# Patient Record
Sex: MALE | Race: WHITE | Employment: FULL TIME | URBAN - METROPOLITAN AREA
[De-identification: names, ages, dates, MRNs, and addresses within clinical notes are randomized per-mention and may not be internally consistent; named-entity substitution may affect disease eponyms.]

---

## 2019-04-12 ENCOUNTER — ANCILLARY PROCEDURE (OUTPATIENT)
Dept: GENERAL RADIOLOGY | Facility: CLINIC | Age: 34
End: 2019-04-12
Attending: PHYSICIAN ASSISTANT
Payer: COMMERCIAL

## 2019-04-12 ENCOUNTER — OFFICE VISIT (OUTPATIENT)
Dept: FAMILY MEDICINE | Facility: CLINIC | Age: 34
End: 2019-04-12
Payer: COMMERCIAL

## 2019-04-12 VITALS
DIASTOLIC BLOOD PRESSURE: 76 MMHG | BODY MASS INDEX: 36.96 KG/M2 | HEIGHT: 71 IN | HEART RATE: 76 BPM | RESPIRATION RATE: 16 BRPM | WEIGHT: 264 LBS | SYSTOLIC BLOOD PRESSURE: 134 MMHG | TEMPERATURE: 98.2 F

## 2019-04-12 DIAGNOSIS — M75.42 IMPINGEMENT SYNDROME, SHOULDER, LEFT: Primary | ICD-10-CM

## 2019-04-12 PROCEDURE — 99214 OFFICE O/P EST MOD 30 MIN: CPT | Performed by: PHYSICIAN ASSISTANT

## 2019-04-12 PROCEDURE — 73030 X-RAY EXAM OF SHOULDER: CPT | Mod: LT

## 2019-04-12 RX ORDER — DICLOFENAC SODIUM 75 MG/1
75 TABLET, DELAYED RELEASE ORAL 2 TIMES DAILY
Qty: 60 TABLET | Refills: 0 | Status: SHIPPED | OUTPATIENT
Start: 2019-04-12 | End: 2022-05-23

## 2019-04-12 ASSESSMENT — PAIN SCALES - GENERAL: PAINLEVEL: NO PAIN (0)

## 2019-04-12 ASSESSMENT — MIFFLIN-ST. JEOR: SCORE: 2163.59
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
## 2022-05-23 ENCOUNTER — OFFICE VISIT (OUTPATIENT)
Dept: FAMILY MEDICINE | Facility: CLINIC | Age: 37
End: 2022-05-23
Payer: COMMERCIAL

## 2022-05-23 VITALS
OXYGEN SATURATION: 100 % | WEIGHT: 264 LBS | SYSTOLIC BLOOD PRESSURE: 134 MMHG | TEMPERATURE: 97.8 F | BODY MASS INDEX: 35.76 KG/M2 | HEART RATE: 80 BPM | DIASTOLIC BLOOD PRESSURE: 85 MMHG | HEIGHT: 72 IN

## 2022-05-23 DIAGNOSIS — M25.511 CHRONIC RIGHT SHOULDER PAIN: ICD-10-CM

## 2022-05-23 DIAGNOSIS — R73.03 PREDIABETES: ICD-10-CM

## 2022-05-23 DIAGNOSIS — Z11.59 NEED FOR HEPATITIS C SCREENING TEST: ICD-10-CM

## 2022-05-23 DIAGNOSIS — Z13.220 SCREENING FOR HYPERLIPIDEMIA: ICD-10-CM

## 2022-05-23 DIAGNOSIS — Z11.4 SCREENING FOR HIV (HUMAN IMMUNODEFICIENCY VIRUS): ICD-10-CM

## 2022-05-23 DIAGNOSIS — Z00.00 ROUTINE GENERAL MEDICAL EXAMINATION AT A HEALTH CARE FACILITY: Primary | ICD-10-CM

## 2022-05-23 DIAGNOSIS — E78.5 HYPERLIPIDEMIA LDL GOAL <160: ICD-10-CM

## 2022-05-23 DIAGNOSIS — G89.29 CHRONIC RIGHT SHOULDER PAIN: ICD-10-CM

## 2022-05-23 PROCEDURE — 90471 IMMUNIZATION ADMIN: CPT | Performed by: PHYSICIAN ASSISTANT

## 2022-05-23 PROCEDURE — 90715 TDAP VACCINE 7 YRS/> IM: CPT | Performed by: PHYSICIAN ASSISTANT

## 2022-05-23 PROCEDURE — 99213 OFFICE O/P EST LOW 20 MIN: CPT | Mod: 25 | Performed by: PHYSICIAN ASSISTANT

## 2022-05-23 PROCEDURE — 99385 PREV VISIT NEW AGE 18-39: CPT | Mod: 25 | Performed by: PHYSICIAN ASSISTANT

## 2022-05-23 ASSESSMENT — ENCOUNTER SYMPTOMS
PARESTHESIAS: 0
SORE THROAT: 0
NAUSEA: 0
ARTHRALGIAS: 1
SHORTNESS OF BREATH: 0
PALPITATIONS: 0
WEAKNESS: 0
MYALGIAS: 0
ABDOMINAL PAIN: 0
FREQUENCY: 0
HEMATURIA: 0
FEVER: 0
EYE PAIN: 0
JOINT SWELLING: 0
CHILLS: 0
COUGH: 0
DIARRHEA: 0
HEMATOCHEZIA: 0
HEARTBURN: 0
DIZZINESS: 0
HEADACHES: 1
NERVOUS/ANXIOUS: 0
DYSURIA: 0
CONSTIPATION: 0
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
## 2022-05-23 NOTE — NURSING NOTE
Prior to immunization administration, verified patients identity using patient s name and date of birth. Please see Immunization Activity for additional information.     Screening Questionnaire for Adult Immunization    Are you sick today?   No   Do you have allergies to medications, food, a vaccine component or latex?   No   Have you ever had a serious reaction after receiving a vaccination?   No   Do you have a long-term health problem with heart, lung, kidney, or metabolic disease (e.g., diabetes), asthma, a blood disorder, no spleen, complement component deficiency, a cochlear implant, or a spinal fluid leak?  Are you on long-term aspirin therapy?   No   Do you have cancer, leukemia, HIV/AIDS, or any other immune system problem?   No   Do you have a parent, brother, or sister with an immune system problem?   No   In the past 3 months, have you taken medications that affect  your immune system, such as prednisone, other steroids, or anticancer drugs; drugs for the treatment of rheumatoid arthritis, Crohn s disease, or psoriasis; or have you had radiation treatments?   No   Have you had a seizure, or a brain or other nervous system problem?   No   During the past year, have you received a transfusion of blood or blood    products, or been given immune (gamma) globulin or antiviral drug?   No   For women: Are you pregnant or is there a chance you could become       pregnant during the next month?   No   Have you received any vaccinations in the past 4 weeks?   No     Immunization questionnaire answers were all negative.        Per orders of Dr. Mendez, injection of TDAP given by Shawna Jo MA. Patient instructed to remain in clinic for 15 minutes afterwards, and to report any adverse reaction to me immediately.       Screening performed by Shawna Jo MA on 5/23/2022 at 11:15 AM.

## 2022-06-02 ENCOUNTER — LAB (OUTPATIENT)
Dept: LAB | Facility: CLINIC | Age: 37
End: 2022-06-02
Payer: COMMERCIAL

## 2022-06-02 DIAGNOSIS — Z13.220 SCREENING FOR HYPERLIPIDEMIA: ICD-10-CM

## 2022-06-02 DIAGNOSIS — Z11.4 SCREENING FOR HIV (HUMAN IMMUNODEFICIENCY VIRUS): ICD-10-CM

## 2022-06-02 DIAGNOSIS — Z00.00 ROUTINE GENERAL MEDICAL EXAMINATION AT A HEALTH CARE FACILITY: ICD-10-CM

## 2022-06-02 DIAGNOSIS — Z11.59 NEED FOR HEPATITIS C SCREENING TEST: ICD-10-CM

## 2022-06-02 LAB
ALBUMIN SERPL-MCNC: 3.7 G/DL (ref 3.4–5)
ALP SERPL-CCNC: 89 U/L (ref 40–150)
ALT SERPL W P-5'-P-CCNC: 35 U/L (ref 0–70)
ANION GAP SERPL CALCULATED.3IONS-SCNC: 3 MMOL/L (ref 3–14)
AST SERPL W P-5'-P-CCNC: 20 U/L (ref 0–45)
BILIRUB SERPL-MCNC: 0.3 MG/DL (ref 0.2–1.3)
BUN SERPL-MCNC: 14 MG/DL (ref 7–30)
CALCIUM SERPL-MCNC: 9.2 MG/DL (ref 8.5–10.1)
CHLORIDE BLD-SCNC: 108 MMOL/L (ref 94–109)
CHOLEST SERPL-MCNC: 183 MG/DL
CO2 SERPL-SCNC: 28 MMOL/L (ref 20–32)
CREAT SERPL-MCNC: 0.96 MG/DL (ref 0.66–1.25)
FASTING STATUS PATIENT QL REPORTED: YES
GFR SERPL CREATININE-BSD FRML MDRD: >90 ML/MIN/1.73M2
GLUCOSE BLD-MCNC: 116 MG/DL (ref 70–99)
HCV AB SERPL QL IA: NONREACTIVE
HDLC SERPL-MCNC: 38 MG/DL
HIV 1+2 AB+HIV1 P24 AG SERPL QL IA: NONREACTIVE
LDLC SERPL CALC-MCNC: 103 MG/DL
NONHDLC SERPL-MCNC: 145 MG/DL
POTASSIUM BLD-SCNC: 4.7 MMOL/L (ref 3.4–5.3)
PROT SERPL-MCNC: 7.6 G/DL (ref 6.8–8.8)
SODIUM SERPL-SCNC: 139 MMOL/L (ref 133–144)
TRIGL SERPL-MCNC: 210 MG/DL

## 2022-06-02 PROCEDURE — 36415 COLL VENOUS BLD VENIPUNCTURE: CPT

## 2022-06-02 PROCEDURE — 80053 COMPREHEN METABOLIC PANEL: CPT

## 2022-06-02 PROCEDURE — 86803 HEPATITIS C AB TEST: CPT

## 2022-06-02 PROCEDURE — 87389 HIV-1 AG W/HIV-1&-2 AB AG IA: CPT

## 2022-06-02 PROCEDURE — 80061 LIPID PANEL: CPT

## 2022-06-03 PROBLEM — R73.03 PREDIABETES: Status: ACTIVE | Noted: 2022-06-03

## 2022-06-03 PROBLEM — E78.5 HYPERLIPIDEMIA LDL GOAL <160: Status: ACTIVE | Noted: 2022-06-03

## 2022-06-07 ENCOUNTER — OFFICE VISIT (OUTPATIENT)
Dept: ORTHOPEDICS | Facility: CLINIC | Age: 37
End: 2022-06-07
Attending: PHYSICIAN ASSISTANT
Payer: COMMERCIAL

## 2022-06-07 VITALS
DIASTOLIC BLOOD PRESSURE: 80 MMHG | SYSTOLIC BLOOD PRESSURE: 134 MMHG | HEIGHT: 72 IN | WEIGHT: 264 LBS | BODY MASS INDEX: 35.76 KG/M2

## 2022-06-07 DIAGNOSIS — S49.91XD INJURY OF RIGHT SHOULDER, SUBSEQUENT ENCOUNTER: ICD-10-CM

## 2022-06-07 PROCEDURE — 99204 OFFICE O/P NEW MOD 45 MIN: CPT | Performed by: PEDIATRICS

## 2022-06-07 NOTE — PATIENT INSTRUCTIONS
Primary considerations at this point include rotator cuff injury, possible development of frozen shoulder syndrome.  Given ongoing symptoms despite physical therapy, plan MRI of the right shoulder next.  Order has been placed.  Plan to contact you with MRI results.  In the meantime, keep up with home exercises from physical therapy.    Advanced imaging is done by appointment. Please call [place] [place] [facility] [place]: [phone] to schedule your MRI.     Depending on your availability you can usually schedule within the next 1-2 days.    Some insurance companies may require a prior authorization to be completed which can delay the time until you are able to schedule your appointment.       If you are active on [employer], you may have access to your test results before your provider is able to review the study and advise on next steps.      The clinic will call you with results. If you have not heard from the clinic within 2-3 days following your MRI, please contact us at the number listed below.    If you have any further questions for your physician or physician s care team you can call [phone] and use option 3 to leave a voice message. Calls received during business hours will be returned same day.
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
## 2022-06-14 ENCOUNTER — ANCILLARY PROCEDURE (OUTPATIENT)
Dept: MRI IMAGING | Facility: CLINIC | Age: 37
End: 2022-06-14
Attending: PEDIATRICS
Payer: COMMERCIAL

## 2022-06-14 DIAGNOSIS — S49.91XD INJURY OF RIGHT SHOULDER, SUBSEQUENT ENCOUNTER: ICD-10-CM

## 2022-06-14 PROCEDURE — 73221 MRI JOINT UPR EXTREM W/O DYE: CPT | Mod: RT | Performed by: RADIOLOGY

## 2022-06-15 ENCOUNTER — TELEPHONE (OUTPATIENT)
Dept: ORTHOPEDICS | Facility: CLINIC | Age: 37
End: 2022-06-15
Payer: COMMERCIAL

## 2022-06-15 DIAGNOSIS — S46.011D TRAUMATIC INCOMPLETE TEAR OF RIGHT ROTATOR CUFF, SUBSEQUENT ENCOUNTER: Primary | ICD-10-CM

## 2022-06-15 DIAGNOSIS — S49.91XD INJURY OF RIGHT SHOULDER, SUBSEQUENT ENCOUNTER: ICD-10-CM

## 2022-06-15 NOTE — TELEPHONE ENCOUNTER
Results for orders placed or performed in visit on 06/14/22   MR Shoulder Right w/o Contrast    Narrative    Exam: MRI of the right shoulder dated 6/14/2022.    COMPARISON: Radiographs dated 5/23/2022.    CLINICAL HISTORY: Shoulder trauma.    TECHNIQUE: Multiplanar, multisequence MR imaging of the right shoulder  was obtained using standard sequences in 3 orthogonal planes without  the use of intravenous or intra-articular gadolinium contrast.    FINDINGS:    No significant joint effusion in the right shoulder glenohumeral  joint. Trace fluid in the subacromial subdeltoid bursa.    Mild degenerative changes at the acromioclavicular joint. No os  acromiale. The coracohumeral ligament is intact.  Preserved subcoracoid fat. The coracoclavicular and coracoacromial  ligaments are intact.    Tendinosis of the anterior to mid substance fibers of the  supraspinatus tendon with moderate to high grade  articular/intrasubstance partial thickness tearing of the anterior to  mid fibers of the supraspinatus tendon. There is reactive bone marrow  edema in the adjacent greater tuberosity at the footprint. The  infraspinatus, teres minor, and subscapularis tendons are intact  without full-thickness tear or tendon retraction.    The muscle bulk is intact without significant atrophy or soft tissue  edema.    Biceps tendon is normal within the bicipital groove. The  intra-articular portion of the biceps tendon is intact.    The humeral head is well aligned with the glenoid. No Hill-Sachs  lesion. No full-thickness cartilage loss. Mild degenerative changes in  the posterior superior labrum.      Impression    IMPRESSION:  1. Tendinosis of the anterior to mid fibers of the right shoulder  supraspinatus tendon, with moderate to high grade  articular/intrasubstance partial thickness tearing of the anterior to  mid fibers of the supraspinatus tendon at the footprint. There is  reactive bone marrow edema in the adjacent greater  tuberosity.  2. No full-thickness tear or tendon retraction involving the right  shoulder rotator cuff tendons.  3. Normal muscle bulk of the right shoulder rotator cuff musculature.  4. Normal-appearing biceps tendon.  5. Mild osteoarthrosis at the right shoulder acromioclavicular joint.  6. Mild degenerative changes in the posterior superior labrum.    CASEY JEFFERY MD         SYSTEM ID:  [id number]

## 2022-06-17 NOTE — TELEPHONE ENCOUNTER
Called and spoke with patient.  Discussed results.  He would like to speak with a surgeon.   Referral placed  Taryn Gordon MS ATC
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
## 2023-01-31 ENCOUNTER — OFFICE VISIT (OUTPATIENT)
Dept: FAMILY MEDICINE | Facility: CLINIC | Age: 38
End: 2023-01-31

## 2023-01-31 VITALS
OXYGEN SATURATION: 97 % | SYSTOLIC BLOOD PRESSURE: 136 MMHG | DIASTOLIC BLOOD PRESSURE: 82 MMHG | TEMPERATURE: 98.1 F | BODY MASS INDEX: 34.97 KG/M2 | HEIGHT: 72 IN | WEIGHT: 258.2 LBS | HEART RATE: 83 BPM | RESPIRATION RATE: 20 BRPM

## 2023-01-31 DIAGNOSIS — B07.0 PLANTAR WARTS: Primary | ICD-10-CM

## 2023-01-31 PROCEDURE — 17110 DESTRUCTION B9 LES UP TO 14: CPT | Performed by: PHYSICIAN ASSISTANT

## 2023-01-31 PROCEDURE — 99213 OFFICE O/P EST LOW 20 MIN: CPT | Mod: 25 | Performed by: PHYSICIAN ASSISTANT

## 2023-01-31 ASSESSMENT — PAIN SCALES - GENERAL: PAINLEVEL: NO PAIN (0)

## 2023-01-31 ASSESSMENT — ENCOUNTER SYMPTOMS
WOUND: 0
FEVER: 0

## 2023-01-31 NOTE — PROGRESS NOTES
"  Assessment & Plan     Plantar warts  Patient is a 37-year-old male who presents to clinic due to pain at lateral aspect of left foot ongoing for 1 year. Vital signs normal. Physical exam significant for plantar wart in area of concern. Discussed treatment options. Proceeded with debridement and cryotherapy. Discussed home treatment plan including salicylic acid, pumice stone debridement and duct tape.  Patient may return for repeat cryotherapy in 2 weeks if desired.  - DESTRUCT BENIGN LESION, UP TO 14    See Patient Instructions    Return in about 2 weeks (around 2/14/2023) for Reevaluation.    Katrin Kuhn PA-C  [facility] SOLEDAD Saavedra is a 37 year old, presenting for the following health issues:  Foot Pain (Left foot pain, possible wart that has gotten larger, hurts to walk) and Health Maintenance (Patient declined influenza, pneumonia and COVID vaccines)      History of Present Illness       Reason for visit:  Foot pain    He eats 0-1 servings of fruits and vegetables daily.He consumes 3 sweetened beverage(s) daily.He exercises with enough effort to increase his heart rate 10 to 19 minutes per day.  He exercises with enough effort to increase his heart rate 3 or less days per week.   He is taking medications regularly.     Patient notes lesion at lateral aspect of left foot that has been present for approximately 1 year and can be tender at times.  Patient has history of common/plantar warts.  No injury.      Review of Systems   Constitutional: Negative for fever.   Skin: Negative for rash and wound.            Objective    /82   Pulse 83   Temp 98.1  F (36.7  C) (Tympanic)   Resp 20   Ht 1.816 m (5' 11.5\")   Wt 117.1 kg (258 lb 3.2 oz)   SpO2 97%   BMI 35.51 kg/m    Body mass index is 35.51 kg/m .  Physical Exam  Vitals and nursing note reviewed.   Constitutional:       General: He is not in acute distress.     Appearance: Normal appearance.   HENT:      Head: " Normocephalic and atraumatic.   Eyes:      Extraocular Movements: Extraocular movements intact.      Pupils: Pupils are equal, round, and reactive to light.   Cardiovascular:      Rate and Rhythm: Normal rate.   Pulmonary:      Effort: Pulmonary effort is normal.   Musculoskeletal:         General: Normal range of motion.      Cervical back: Normal range of motion.   Skin:     General: Skin is warm and dry.      Comments: Left foot: Lateral aspect with approximately 0.5 centimeters plantar wart   Neurological:      General: No focal deficit present.      Mental Status: He is alert.   Psychiatric:         Mood and Affect: Mood normal.         Behavior: Behavior normal.        Procedure: Wart cryotherapy     Verbal consent was obtained from parent/patient after a discussion of the risks and benefits. Area was prepped with alcohol.  Dead skin layer was pared down with derma blade.  Liquid nitrogen was applied using a spray gun with good tolerance to the lesion. 3 rounds of freeze/thaw. Pt tolerated the procedure well.  There were no complications.

## 2023-01-31 NOTE — PATIENT INSTRUCTIONS
The area of discomfort/lesion on your left foot is a plantar wart.  This was treated with cryotherapy/freezing today and the dead skin layer on top was removed.    At home wart treatment:    -Apply paint on salicylic acid/Compound W every evening.  Let dry and then cover with duct tape. Repeat this every 24 hours.  -If warts are on the hand, you can remove duct tape during the day.  -After baths or showers, please use a pumice stone to remove dead skin.      You have been scheduled for a follow-up treatment in 2 weeks with Steve Rapp PA-C.  Please reach out with questions or concerns.  Follow-up sooner for new or worsening symptoms.

## 2023-02-13 NOTE — PROGRESS NOTES
"      Subjective   Quentin is a 37 year oldpresenting for the following health issues:  RECHECK (Lt foot wart treatment. Declined vaccines.)      History of Present Illness       Reason for visit:  Foot pain    He eats 0-1 servings of fruits and vegetables daily.He consumes 3 sweetened beverage(s) daily.He exercises with enough effort to increase his heart rate 10 to 19 minutes per day.  He exercises with enough effort to increase his heart rate 3 or less days per week.   He is taking medications regularly.     Recheck of obesity. Discussed a lower calorie diet and consistent mix of aerobic exercise and strength training.       Review of Systems   Constitutional, HEENT, cardiovascular, pulmonary, GI, , musculoskeletal, neuro, skin, endocrine and psych systems are negative, except as otherwise noted.      Objective    /78   Pulse 77   Temp 97.3  F (36.3  C) (Tympanic)   Resp 17   Ht 1.817 m (5' 11.54\")   Wt 118 kg (260 lb 3.2 oz)   SpO2 98%   BMI 35.75 kg/m    Body mass index is 35.75 kg/m .  Physical Exam   SUBJECTIVE:    Plantar wart lateral left foot.     Procedure:  Each wart was frozen easily three times with liquid nitrogen.  Each wart was pared with a #15 blade.  A total of 1 warts are treated today.  The etiology of common warts were discussed.  .name will continue to use the over-the-counter medications such as Compound W on a nightly basis.  Warm soapy water soaks and sanding also recommended.  The patient is to return every two weeks until all warts have been removed.    Quentin was seen today for recheck.    Diagnoses and all orders for this visit:    Plantar warts  -     DESTRUCT BENIGN LESION, UP TO 14    Morbid obesity (H)      Exercise  Lower calorie diet   Compound w.      "

## 2023-02-14 ENCOUNTER — OFFICE VISIT (OUTPATIENT)
Dept: FAMILY MEDICINE | Facility: CLINIC | Age: 38
End: 2023-02-14

## 2023-02-14 VITALS
RESPIRATION RATE: 17 BRPM | HEIGHT: 72 IN | OXYGEN SATURATION: 98 % | BODY MASS INDEX: 35.24 KG/M2 | SYSTOLIC BLOOD PRESSURE: 130 MMHG | DIASTOLIC BLOOD PRESSURE: 78 MMHG | TEMPERATURE: 97.3 F | WEIGHT: 260.2 LBS | HEART RATE: 77 BPM

## 2023-02-14 DIAGNOSIS — E66.01 MORBID OBESITY (H): ICD-10-CM

## 2023-02-14 DIAGNOSIS — B07.0 PLANTAR WARTS: Primary | ICD-10-CM

## 2023-02-14 PROCEDURE — 99212 OFFICE O/P EST SF 10 MIN: CPT | Mod: 25 | Performed by: PHYSICIAN ASSISTANT

## 2023-02-14 PROCEDURE — 17110 DESTRUCTION B9 LES UP TO 14: CPT | Performed by: PHYSICIAN ASSISTANT

## 2023-02-14 ASSESSMENT — PAIN SCALES - GENERAL: PAINLEVEL: NO PAIN (0)

## 2023-02-18 ENCOUNTER — HEALTH MAINTENANCE LETTER (OUTPATIENT)
Age: 38
End: 2023-02-18

## 2023-08-19 ENCOUNTER — HEALTH MAINTENANCE LETTER (OUTPATIENT)
Age: 38
End: 2023-08-19

## 2024-02-21 ENCOUNTER — OFFICE VISIT (OUTPATIENT)
Dept: OTOLARYNGOLOGY | Facility: OTHER | Age: 39
End: 2024-02-21

## 2024-02-21 ENCOUNTER — NURSE TRIAGE (OUTPATIENT)
Dept: NURSING | Facility: CLINIC | Age: 39
End: 2024-02-21

## 2024-02-21 VITALS
SYSTOLIC BLOOD PRESSURE: 138 MMHG | DIASTOLIC BLOOD PRESSURE: 88 MMHG | WEIGHT: 253.09 LBS | TEMPERATURE: 98.1 F | BODY MASS INDEX: 34.28 KG/M2 | HEIGHT: 72 IN

## 2024-02-21 DIAGNOSIS — S02.2XXA CLOSED FRACTURE OF NASAL BONE, INITIAL ENCOUNTER: Primary | ICD-10-CM

## 2024-02-21 PROCEDURE — 99203 OFFICE O/P NEW LOW 30 MIN: CPT | Mod: 25 | Performed by: OTOLARYNGOLOGY

## 2024-02-21 PROCEDURE — 21315 CLSD TX NSL FX MNPJ WO STBLJ: CPT | Performed by: OTOLARYNGOLOGY
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
## 2024-02-21 NOTE — TELEPHONE ENCOUNTER
Attempted to contact patient as Dr. Mendoza has an opening at 3:15pm today. This appointment is first come first serve. Left message for patient to call the ENT scheduling line to inquire if this visit is still available.    Bria Correia RN on 2/21/2024 at 10:19 AM
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
## 2024-02-21 NOTE — PROGRESS NOTES
ENT Consultation    Quentin Bedolla who is a 38 year old male seen in consultation at the request of self.      History of Present Illness - Quentin Bedolla is a 38 year old male teslas presents because of dry      Patient.  Ago.  Sustained nasal fracture that is quite visible to the patient.  There is also more difficulty breathing on the left side which he Did not have prior to nasal trauma.  Denies any visual disturbances or any difficulty chewing or biting.  Denies any eye pain.    BP Readings from Last 1 Encounters:   02/21/24 138/88       BP noted to be well controlled today in office.     Quentin IS a smoker/uses chewing tobacco.  Quentin is not ready to quit      Past Medical History - History reviewed. No pertinent past medical history.    Current Medications - No current outpatient medications on file.    Allergies -   Allergies   Allergen Reactions    Pediazole [Erythromycin-Sulfisoxazole]        Social History -   Social History     Socioeconomic History    Marital status:    Tobacco Use    Smoking status: Every Day    Smokeless tobacco: Never    Tobacco comments:     1 pack per day   Vaping Use    Vaping Use: Never used   Substance and Sexual Activity    Alcohol use: Yes     Comment: 12 beers on Friday nights    Drug use: Yes     Types: Marijuana     Comment: marijuana on Friday nights    Sexual activity: Yes     Partners: Female       Family History -   Family History   Problem Relation Age of Onset    Hypertension Mother     Hypertension Father     Heart Disease Paternal Uncle         leaky heart valve       Review of Systems - As per HPI and PMHx, otherwise review of system review of the head and neck negative. Otherwise 10+ review of system is negative    Physical Exam  /88   Temp 98.1  F (36.7  C) (Temporal)   Ht 1.829 m (6')   Wt 114.8 kg (253 lb 1.4 oz)   BMI 34.32 kg/m    BMI: Body mass index is 34.32 kg/m .    General - The patient is well nourished and well developed, and appears to  have good nutritional status.  Alert and oriented to person and place, answers questions and cooperates with examination appropriately.    SKIN - No suspicious lesions or rashes.  Respiration - No respiratory distress.  Head and Face - Normocephalic but not atraumatic, with gross asymmetry noted of the contour of the facial features.  The facial nerve is intact, with strong symmetric movements.    Voice and Breathing - The patient was breathing comfortably without the use of accessory muscles. The patients voice was clear and strong, and had appropriate pitch and quality.    Ears - Bilateral pinna and EACs with normal appearing overlying skin. Tympanic membrane intact with good mobility on pneumatic otoscopy bilaterally. Bony landmarks of the ossicular chain are normal. The tympanic membranes are normal in appearance. No retraction, perforation, or masses.  No fluid or purulence was seen in the external canal or the middle ear.     Eyes - Extraocular movements intact.  Sclera were not icteric or injected, conjunctiva were pink and moist.    Mouth - Examination of the oral cavity showed pink, healthy oral mucosa. No lesions or ulcerations noted.  The tongue was mobile and midline, and the dentition were in good condition.      Throat - The walls of the oropharynx were smooth, pink, moist, symmetric, and had no lesions or ulcerations.  The tonsillar pillars and soft palate were symmetric.  The uvula was midline on elevation.    Neck - Normal midline excursion of the laryngotracheal complex during swallowing.  Full range of motion on passive movement.  Palpation of the occipital, submental, submandibular, internal jugular chain, and supraclavicular nodes did not demonstrate any abnormal lymph nodes or masses.  The carotid pulse was palpable bilaterally.  Palpation of the thyroid was soft and smooth, with no nodules or goiter appreciated.  The trachea was mobile and midline.    Nose - External contour demonstrates  deflected nasal bone to the left as well as a curved cartilaginous aspect of the nose and the tip slightly to the left.  No evidence of septal hematoma.  Nasal mucosa is pink and moist with no abnormal mucus.  The septum was deviated anteriorly to the left and somewhat obstructive, turbinates of normal size and position.  No polyps, masses, or purulence noted on examination.  Definitely on palpation bony step-off noted on both sides.    Neuro - Nonfocal neuro exam is normal, CN 2 through 12 intact, normal gait and muscle tone.      Performed in clinic today: Today we discussed options with the patient potentially either take him to the OR early next week for close reduction nasal fracture and external stabilization with [place] splint with understanding that the cartilaginous portion of the tip and septum may not be addressed.  He may also consider waiting and then doing ultrastructure septorhinoplasty or try a closed reduction nasal fracture in the clinic under local anesthetic.  He chooses the latter to do it under local anesthetic.  Patient was topically anesthetized intranasally with Xylocaine decongestant Dutch-Synephrine.  He was also locally injected with 1% lidocaine 1-100,000 epinephrine around the bony dorsum of the nose that is deflected.  Using a manual techniques with proper pressure on the left side I was able to move bony dorsum to the right.  There was minimal bleeding on the right side.  It was well-controlled.  Also he was able to breathe better through his nose and the septum looks slightly straighter.  He still has a little bit of bowing of the cartilaginous dorsum which does not seem to bother him.      A/P - Quentin Bedolla is a 38 year old male status post closed reduction nasal fracture.  Tolerated well.  Will use ibuprofen for discomfort.  Patient cautioned about any contact sports or anything physically that may refracture his nose.  He will follow-up in about 6 to 7 weeks.      Sung Mendoza  MD

## 2024-02-22 ENCOUNTER — TRANSCRIBE ORDERS (OUTPATIENT)
Dept: OTHER | Age: 39
End: 2024-02-22

## 2024-02-22 DIAGNOSIS — S02.2XXA CLOSED FRACTURE OF NASAL BONE, INITIAL ENCOUNTER: Primary | ICD-10-CM

## 2024-02-23 NOTE — TELEPHONE ENCOUNTER
No provider at [facility], , [place], [place].    VISHNU Welch RN 2/23/2024 11:29 AM

## 2024-10-12 ENCOUNTER — HEALTH MAINTENANCE LETTER (OUTPATIENT)
Age: 39
End: 2024-10-12